# Patient Record
Sex: MALE | Race: ASIAN
[De-identification: names, ages, dates, MRNs, and addresses within clinical notes are randomized per-mention and may not be internally consistent; named-entity substitution may affect disease eponyms.]

---

## 2022-01-31 ENCOUNTER — HOSPITAL ENCOUNTER (EMERGENCY)
Dept: HOSPITAL 54 - ER | Age: 67
Discharge: HOME | End: 2022-01-31
Payer: COMMERCIAL

## 2022-01-31 VITALS
SYSTOLIC BLOOD PRESSURE: 136 MMHG | BODY MASS INDEX: 26.68 KG/M2 | HEIGHT: 67 IN | WEIGHT: 170 LBS | DIASTOLIC BLOOD PRESSURE: 84 MMHG

## 2022-01-31 DIAGNOSIS — Z79.899: ICD-10-CM

## 2022-01-31 DIAGNOSIS — B02.9: Primary | ICD-10-CM

## 2022-01-31 NOTE — NUR
AAOX3, CAME TO ER C/O LEFT SIDE HEADACHE X 1 WEEK, ALSO C/O SWELLING, REDNESS 
AND BURNING SENSATION TO HIS SCALP AND FOREHEAD. RESP IS EVEN AND UNLABORED 
WITH NO APPARENT DISTRESS NOTED. AWAITING MD FOR FIDELIA.